# Patient Record
Sex: MALE | Race: BLACK OR AFRICAN AMERICAN | URBAN - METROPOLITAN AREA
[De-identification: names, ages, dates, MRNs, and addresses within clinical notes are randomized per-mention and may not be internally consistent; named-entity substitution may affect disease eponyms.]

---

## 2020-07-13 ENCOUNTER — OFFICE VISIT (OUTPATIENT)
Dept: URBAN - METROPOLITAN AREA CLINIC 78 | Facility: CLINIC | Age: 20
End: 2020-07-13

## 2020-07-16 ENCOUNTER — OFFICE VISIT (OUTPATIENT)
Dept: URBAN - METROPOLITAN AREA CLINIC 78 | Facility: CLINIC | Age: 20
End: 2020-07-16
Payer: COMMERCIAL

## 2020-07-16 ENCOUNTER — WEB ENCOUNTER (OUTPATIENT)
Dept: URBAN - METROPOLITAN AREA CLINIC 78 | Facility: CLINIC | Age: 20
End: 2020-07-16

## 2020-07-16 DIAGNOSIS — R63.5 WEIGHT GAIN: ICD-10-CM

## 2020-07-16 DIAGNOSIS — R10.11 RIGHT UPPER QUADRANT ABDOMINAL PAIN: ICD-10-CM

## 2020-07-16 DIAGNOSIS — R19.4 CHANGE IN BOWEL FUNCTION: ICD-10-CM

## 2020-07-16 DIAGNOSIS — R11.10 VOMITING: ICD-10-CM

## 2020-07-16 DIAGNOSIS — K21.9 GERD: ICD-10-CM

## 2020-07-16 PROCEDURE — 99204 OFFICE O/P NEW MOD 45 MIN: CPT | Performed by: INTERNAL MEDICINE

## 2020-07-16 RX ORDER — PANTOPRAZOLE SODIUM 40 MG/1
1 TABLET TABLET, DELAYED RELEASE ORAL ONCE A DAY
Qty: 90 | Refills: 1 | OUTPATIENT
Start: 2020-07-16

## 2020-07-16 NOTE — HPI-TODAY'S VISIT:
Patient is seen in consultation for GI symptoms    Started off with vomiting every morning x 1 month ( every morning when he wakes up feeling nauseas )  He reports vomiting with 15 minutes of waking up  Emesis is less freq last week unless he eats late    He reports cramping and lighheadness  BM are freq ..3-5 x day , consistency , loose to soft  No nocturnal symptoms  Baseline BM : Once a day    Blood work done 2 weeks ago : Reportedly Normal   He is trying to do cleanses "apple cider vinegar " and  other cleanses  Almost he is dehydrated a lot  , drinks lot of water  Body weight is bouncing around   Denies subs abuse  current  Denies etoh use current  Denies Marijuana use current  Denies NSAID use    Gained 20 lbs in recent times

## 2020-07-19 PROBLEM — 408512008 BODY MASS INDEX 40+ - SEVERELY OBESE: Status: ACTIVE | Noted: 2020-07-19

## 2020-08-05 ENCOUNTER — OFFICE VISIT (OUTPATIENT)
Dept: URBAN - METROPOLITAN AREA CLINIC 77 | Facility: CLINIC | Age: 20
End: 2020-08-05
Payer: COMMERCIAL

## 2020-08-05 DIAGNOSIS — R10.84 ABDOMINAL PAIN, GENERALIZED: ICD-10-CM

## 2020-08-05 DIAGNOSIS — R11.0 NAUSEA: ICD-10-CM

## 2020-08-05 DIAGNOSIS — K76.0 HEPATIC STEATOSIS: ICD-10-CM

## 2020-08-05 PROCEDURE — 76705 ECHO EXAM OF ABDOMEN: CPT | Performed by: INTERNAL MEDICINE

## 2020-08-05 RX ORDER — PANTOPRAZOLE SODIUM 40 MG/1
1 TABLET TABLET, DELAYED RELEASE ORAL ONCE A DAY
Qty: 90 | Refills: 1 | Status: ACTIVE | COMMUNITY
Start: 2020-07-16

## 2020-08-14 ENCOUNTER — OFFICE VISIT (OUTPATIENT)
Dept: URBAN - METROPOLITAN AREA TELEHEALTH 2 | Facility: TELEHEALTH | Age: 20
End: 2020-08-14
Payer: COMMERCIAL

## 2020-08-14 DIAGNOSIS — R10.13 DYSPEPSIA: ICD-10-CM

## 2020-08-14 DIAGNOSIS — K58.9 IBS (IRRITABLE BOWEL SYNDROME): ICD-10-CM

## 2020-08-14 DIAGNOSIS — N50.8 TESTICLE PAIN: ICD-10-CM

## 2020-08-14 DIAGNOSIS — R11.0 NAUSEA: ICD-10-CM

## 2020-08-14 DIAGNOSIS — K76.0 FATTY LIVER: ICD-10-CM

## 2020-08-14 PROCEDURE — 99214 OFFICE O/P EST MOD 30 MIN: CPT | Performed by: INTERNAL MEDICINE

## 2020-08-14 RX ORDER — DICYCLOMINE HYDROCHLORIDE 10 MG/1
1 CAPSULE CAPSULE ORAL THREE TIMES A DAY
Qty: 90 CAPSULE | Refills: 0 | OUTPATIENT
Start: 2020-08-14

## 2020-08-14 RX ORDER — PANTOPRAZOLE SODIUM 40 MG/1
1 TABLET TABLET, DELAYED RELEASE ORAL ONCE A DAY
Qty: 90 | Refills: 1 | Status: ACTIVE | COMMUNITY
Start: 2020-07-16

## 2020-08-14 RX ORDER — PANTOPRAZOLE SODIUM 40 MG/1
1 TABLET TABLET, DELAYED RELEASE ORAL ONCE A DAY
OUTPATIENT
Start: 2020-07-16

## 2020-08-19 ENCOUNTER — DASHBOARD ENCOUNTERS (OUTPATIENT)
Age: 20
End: 2020-08-19

## 2020-09-11 ENCOUNTER — OFFICE VISIT (OUTPATIENT)
Dept: URBAN - METROPOLITAN AREA SURGERY CENTER 15 | Facility: SURGERY CENTER | Age: 20
End: 2020-09-11
Payer: COMMERCIAL

## 2020-09-11 DIAGNOSIS — K44.9 DIAPHRAGMATIC HERNIA: ICD-10-CM

## 2020-09-11 DIAGNOSIS — K22.8 COLUMNAR-LINED ESOPHAGUS: ICD-10-CM

## 2020-09-11 DIAGNOSIS — K29.60 ADENOPAPILLOMATOSIS GASTRICA: ICD-10-CM

## 2020-09-11 PROCEDURE — G8907 PT DOC NO EVENTS ON DISCHARG: HCPCS | Performed by: INTERNAL MEDICINE

## 2020-09-11 PROCEDURE — 43239 EGD BIOPSY SINGLE/MULTIPLE: CPT | Performed by: INTERNAL MEDICINE
